# Patient Record
Sex: FEMALE | Race: BLACK OR AFRICAN AMERICAN | NOT HISPANIC OR LATINO | URBAN - METROPOLITAN AREA
[De-identification: names, ages, dates, MRNs, and addresses within clinical notes are randomized per-mention and may not be internally consistent; named-entity substitution may affect disease eponyms.]

---

## 2021-09-24 ENCOUNTER — EMERGENCY (EMERGENCY)
Facility: HOSPITAL | Age: 61
LOS: 1 days | Discharge: HOME | End: 2021-09-24
Attending: EMERGENCY MEDICINE | Admitting: EMERGENCY MEDICINE
Payer: MEDICARE

## 2021-09-24 VITALS
OXYGEN SATURATION: 100 % | TEMPERATURE: 99 F | HEIGHT: 59 IN | RESPIRATION RATE: 18 BRPM | SYSTOLIC BLOOD PRESSURE: 91 MMHG | DIASTOLIC BLOOD PRESSURE: 60 MMHG | WEIGHT: 100.09 LBS | HEART RATE: 76 BPM

## 2021-09-24 DIAGNOSIS — G25.82 STIFF-MAN SYNDROME: ICD-10-CM

## 2021-09-24 DIAGNOSIS — R53.1 WEAKNESS: ICD-10-CM

## 2021-09-24 DIAGNOSIS — R10.11 RIGHT UPPER QUADRANT PAIN: ICD-10-CM

## 2021-09-24 DIAGNOSIS — M54.5 LOW BACK PAIN: ICD-10-CM

## 2021-09-24 DIAGNOSIS — D25.9 LEIOMYOMA OF UTERUS, UNSPECIFIED: ICD-10-CM

## 2021-09-24 DIAGNOSIS — R63.4 ABNORMAL WEIGHT LOSS: ICD-10-CM

## 2021-09-24 LAB
ALBUMIN SERPL ELPH-MCNC: 4.2 G/DL — SIGNIFICANT CHANGE UP (ref 3.3–5)
ALP SERPL-CCNC: 75 U/L — SIGNIFICANT CHANGE UP (ref 40–120)
ALT FLD-CCNC: 10 U/L — SIGNIFICANT CHANGE UP (ref 10–45)
ANION GAP SERPL CALC-SCNC: 7 MMOL/L — SIGNIFICANT CHANGE UP (ref 5–17)
APTT BLD: 34.8 SEC — SIGNIFICANT CHANGE UP (ref 27.5–35.5)
AST SERPL-CCNC: 21 U/L — SIGNIFICANT CHANGE UP (ref 10–40)
BASOPHILS # BLD AUTO: 0.02 K/UL — SIGNIFICANT CHANGE UP (ref 0–0.2)
BASOPHILS NFR BLD AUTO: 0.4 % — SIGNIFICANT CHANGE UP (ref 0–2)
BILIRUB SERPL-MCNC: 0.3 MG/DL — SIGNIFICANT CHANGE UP (ref 0.2–1.2)
BUN SERPL-MCNC: 6 MG/DL — LOW (ref 7–23)
CALCIUM SERPL-MCNC: 9.2 MG/DL — SIGNIFICANT CHANGE UP (ref 8.4–10.5)
CHLORIDE SERPL-SCNC: 106 MMOL/L — SIGNIFICANT CHANGE UP (ref 96–108)
CO2 SERPL-SCNC: 30 MMOL/L — SIGNIFICANT CHANGE UP (ref 22–31)
CREAT SERPL-MCNC: 0.79 MG/DL — SIGNIFICANT CHANGE UP (ref 0.5–1.3)
EOSINOPHIL # BLD AUTO: 0.03 K/UL — SIGNIFICANT CHANGE UP (ref 0–0.5)
EOSINOPHIL NFR BLD AUTO: 0.6 % — SIGNIFICANT CHANGE UP (ref 0–6)
GLUCOSE SERPL-MCNC: 82 MG/DL — SIGNIFICANT CHANGE UP (ref 70–99)
HCT VFR BLD CALC: 41.9 % — SIGNIFICANT CHANGE UP (ref 34.5–45)
HGB BLD-MCNC: 13 G/DL — SIGNIFICANT CHANGE UP (ref 11.5–15.5)
IMM GRANULOCYTES NFR BLD AUTO: 0.4 % — SIGNIFICANT CHANGE UP (ref 0–1.5)
INR BLD: 1.05 — SIGNIFICANT CHANGE UP (ref 0.88–1.16)
LIDOCAIN IGE QN: 59 U/L — SIGNIFICANT CHANGE UP (ref 7–60)
LYMPHOCYTES # BLD AUTO: 1.45 K/UL — SIGNIFICANT CHANGE UP (ref 1–3.3)
LYMPHOCYTES # BLD AUTO: 26.9 % — SIGNIFICANT CHANGE UP (ref 13–44)
MCHC RBC-ENTMCNC: 29.8 PG — SIGNIFICANT CHANGE UP (ref 27–34)
MCHC RBC-ENTMCNC: 31 GM/DL — LOW (ref 32–36)
MCV RBC AUTO: 96.1 FL — SIGNIFICANT CHANGE UP (ref 80–100)
MONOCYTES # BLD AUTO: 0.47 K/UL — SIGNIFICANT CHANGE UP (ref 0–0.9)
MONOCYTES NFR BLD AUTO: 8.7 % — SIGNIFICANT CHANGE UP (ref 2–14)
NEUTROPHILS # BLD AUTO: 3.4 K/UL — SIGNIFICANT CHANGE UP (ref 1.8–7.4)
NEUTROPHILS NFR BLD AUTO: 63 % — SIGNIFICANT CHANGE UP (ref 43–77)
NRBC # BLD: 0 /100 WBCS — SIGNIFICANT CHANGE UP (ref 0–0)
PLATELET # BLD AUTO: 395 K/UL — SIGNIFICANT CHANGE UP (ref 150–400)
POTASSIUM SERPL-MCNC: 4.1 MMOL/L — SIGNIFICANT CHANGE UP (ref 3.5–5.3)
POTASSIUM SERPL-SCNC: 4.1 MMOL/L — SIGNIFICANT CHANGE UP (ref 3.5–5.3)
PROT SERPL-MCNC: 7.9 G/DL — SIGNIFICANT CHANGE UP (ref 6–8.3)
PROTHROM AB SERPL-ACNC: 12.6 SEC — SIGNIFICANT CHANGE UP (ref 10.6–13.6)
RBC # BLD: 4.36 M/UL — SIGNIFICANT CHANGE UP (ref 3.8–5.2)
RBC # FLD: 13.7 % — SIGNIFICANT CHANGE UP (ref 10.3–14.5)
SODIUM SERPL-SCNC: 143 MMOL/L — SIGNIFICANT CHANGE UP (ref 135–145)
WBC # BLD: 5.39 K/UL — SIGNIFICANT CHANGE UP (ref 3.8–10.5)
WBC # FLD AUTO: 5.39 K/UL — SIGNIFICANT CHANGE UP (ref 3.8–10.5)

## 2021-09-24 PROCEDURE — 80053 COMPREHEN METABOLIC PANEL: CPT

## 2021-09-24 PROCEDURE — 83690 ASSAY OF LIPASE: CPT

## 2021-09-24 PROCEDURE — 99283 EMERGENCY DEPT VISIT LOW MDM: CPT

## 2021-09-24 PROCEDURE — 85025 COMPLETE CBC W/AUTO DIFF WBC: CPT

## 2021-09-24 PROCEDURE — 85610 PROTHROMBIN TIME: CPT

## 2021-09-24 PROCEDURE — 85730 THROMBOPLASTIN TIME PARTIAL: CPT

## 2021-09-24 PROCEDURE — 99284 EMERGENCY DEPT VISIT MOD MDM: CPT

## 2021-09-24 PROCEDURE — 36415 COLL VENOUS BLD VENIPUNCTURE: CPT

## 2021-09-24 RX ORDER — ACETAMINOPHEN 500 MG
650 TABLET ORAL ONCE
Refills: 0 | Status: COMPLETED | OUTPATIENT
Start: 2021-09-24 | End: 2021-09-24

## 2021-09-24 RX ADMIN — Medication 650 MILLIGRAM(S): at 17:52

## 2021-09-24 NOTE — ED PROVIDER NOTE - PHYSICAL EXAMINATION
GEN: Elderly. frail, well developed, awake, alert, oriented to person, place, time/situation and in no apparent distress. NTAF  ENT: Airway patent, Nasal mucosa clear. Mouth with normal mucosa.  EYES: Clear bilaterally. PERRL, EOMI  RESPIRATORY: Breathing comfortably with normal RR. No W/C/R, no hypoxia or resp distress.  CARDIAC: Regular rate and rhythm, no M/R/G  ABDOMEN: Soft, nontender, +bowel sounds, no rebound, rigidity, or guarding. No CVAT. Nonsurgical abd.   MSK: Range of motion is not limited, no deformities noted.  NEURO: Alert and oriented, no focal deficits.  SKIN: Skin normal color for race, warm, dry and intact. No evidence of rash.  PSYCH: Alert and oriented to person, place, time/situation. normal mood and affect. no apparent risk to self or others.

## 2021-09-24 NOTE — ED PROVIDER NOTE - NSFOLLOWUPINSTRUCTIONS_ED_ALL_ED_FT
Please follow up with your primary care physician and a gastroenterologist for further workup of your symptoms and weight loss. If you have any problem getting an appointment this week, please call the ED Referral Coordinator at [phone].  Return to the Emergency Department if you have any new or worsening symptoms, or for any other concerns. Please read below for further information.      Abdominal Pain    Many things can cause abdominal pain. Many times, abdominal pain is not caused by a disease and will improve without treatment. Your health care provider will do a physical exam to determine if there is a dangerous cause of your pain; blood tests and imaging may help determine the cause of your pain. However, in many cases, no cause may be found and you may need further testing as an outpatient. Monitor your abdominal pain for any changes.     SEEK IMMEDIATE MEDICAL CARE IF YOU HAVE ANY OF THE FOLLOWING SYMPTOMS: worsening abdominal pain, uncontrollable vomiting, profuse diarrhea, inability to have bowel movements or pass gas, black or bloody stools, fever accompanying chest pain or back pain, or fainting. These symptoms may represent a serious problem that is an emergency. Do not wait to see if the symptoms will go away. Get medical help right away. Call 911 and do not drive yourself to the hospital.      High-Protein and High-Calorie Diet    Eating high-protein and high-calorie foods can help you to gain weight, heal after an injury, and recover after an illness or surgery. The specific amount of daily protein and calories you need depends on:  •Your body weight.      •The reason this diet is recommended for you.        What is my plan?  Generally, a high-protein, high-calorie diet involves:  •Eating 250–500 extra calories each day.      •Making sure that you get enough of your daily calories from protein. Ask your health care provider how many of your calories should come from protein.      Talk with a health care provider, such as a diet and nutrition specialist (dietitian), about how much protein and how many calories you need each day. Follow the diet as directed by your health care provider.      What are tips for following this plan?     Preparing meals     •Add whole milk, half-and-half, or heavy cream to cereal, pudding, soup, or hot cocoa.      •Add whole milk to instant breakfast drinks.      •Add peanut butter to oatmeal or smoothies.      •Add powdered milk to baked goods, smoothies, or milkshakes.      •Add powdered milk, cream, or butter to mashed potatoes.      •Add cheese to cooked vegetables.      •Make whole-milk yogurt parfaits. Top them with granola, fruit, or nuts.      •Add cottage cheese to your fruit.      •Add avocado, cheese, or both to sandwiches or salads.      •Add meat, poultry, or seafood to rice, pasta, casseroles, salads, and soups.      •Use mayonnaise when making egg salad, chicken salad, or tuna salad.      •Use peanut butter as a dip for vegetables or as a topping for pretzels, celery, or crackers.      •Add beans to casseroles, dips, and spreads.      •Add pureed beans to sauces and soups.      •Replace calorie-free drinks with calorie-containing drinks, such as milk and fruit juice.      •Replace water with milk or heavy cream when making foods such as oatmeal, pudding, or cocoa.      General instructions     •Ask your health care provider if you should take a nutritional supplement.      •Try to eat six small meals each day instead of three large meals.      •Eat a balanced diet. In each meal, include one food that is high in protein.      •Keep nutritious snacks available, such as nuts, trail mixes, dried fruit, and yogurt.      •If you have kidney disease or diabetes, talk with your health care provider about how much protein is safe for you. Too much protein may put extra stress on your kidneys.      •Drink your calories. Choose high-calorie drinks and have them after your meals.        What high-protein foods should I eat?     Vegetables     Soybeans. Peas.    Grains     Quinoa. Bulgur wheat.    Meats and other proteins     Beef, pork, and poultry. Fish and seafood. Eggs. Tofu. Textured vegetable protein (TVP). Peanut butter. Nuts and seeds. Dried beans. Protein powders.    Dairy     Whole milk. Whole-milk yogurt. Powdered milk. Cheese. Cottage Cheese. Eggnog.    Beverages     High-protein supplement drinks. Soy milk.    Other foods     Protein bars.    The items listed above may not be a complete list of high-protein foods and beverages. Contact a dietitian for more options.       What high-calorie foods should I eat?    Fruits     Dried fruit. Fruit leather. Canned fruit in syrup. Fruit juice. Avocado.    Vegetables     Vegetables cooked in oil or butter. Fried potatoes.    Grains     Pasta. Quick breads. Muffins. Pancakes. Ready-to-eat cereal.    Meats and other proteins     Peanut butter. Nuts and seeds.    Dairy     Heavy cream. Whipped cream. Cream cheese. Sour cream. Ice cream. Custard. Pudding.    Beverages     Meal-replacement beverages. Nutrition shakes. Fruit juice. Sugar-sweetened soft drinks.    Seasonings and condiments     Salad dressing. Mayonnaise. Edmond sauce. Fruit preserves or jelly. Honey. Syrup.    Sweets and desserts     Cake. Cookies. Pie. Pastries. Candy bars. Chocolate.    Fats and oils     Butter or margarine. Oil. Gravy.    Other foods     Meal-replacement bars.    The items listed above may not be a complete list of high-calorie foods and beverages. Contact a dietitian for more options.       Summary    •A high-protein, high-calorie diet can help you gain weight or heal faster after an injury, illness, or surgery.      •To increase your protein and calories, add ingredients such as whole milk, peanut butter, cheese, beans, meat, or seafood to meal items.      •To get enough extra calories each day, include high-calorie foods and beverages at each meal.      •Adding a high-calorie drink or shake can be an easy way to help you get enough calories each day. Talk with your healthcare provider or dietitian about the best options for you.      This information is not intended to replace advice given to you by your health care provider. Make sure you discuss any questions you have with your health care provider.

## 2021-09-24 NOTE — ED PROVIDER NOTE - OBJECTIVE STATEMENT
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 Pt is followed at [facility] and was worked up for this back about 2 weeks ago with CT a/p (pt brings reports with her) that shows uterine fibroids and remonstration of CBD dilation to 9mm and prominence of main pancreatic duct (stable from 6/27/20) likely benign but recommend MRCP if clinically warranted. Pt states she had an appointment with GI at [facility] but was not happy that it was a televisit. She felt generally weak this morning so decided to come to .

## 2021-09-24 NOTE — ED PROVIDER NOTE - PATIENT PORTAL LINK FT
You can access the FollowMyHealth Patient Portal offered by [facility] by registering at the following website: [email or web address]. By joining [facility]’s FollowMyHealth portal, you will also be able to view your health information using other applications (apps) compatible with our system.

## 2021-09-24 NOTE — ED PROVIDER NOTE - IV ALTEPLASE INCLUSION HIDDEN
PROGRESS NOTE:   Authored by Deandre Dwyer [phone]    Patient is a 37y old  Male who presents with a chief complaint of abdominal pain (13 Aug 2020 10:28)    SUBJECTIVE / OVERNIGHT EVENTS:  Patient w/ significant bleeding at permacat site overnight. There was no evidence of hematoma and patient was hemodynamically stable. Pressure was applied to the site, which stopped the bleeding.     REVIEW OF SYSTEMS:  CONSTITUTIONAL: No weakness, fevers or chills  EYES/ENT: No visual changes;  No vertigo or throat pain   NECK: No pain or stiffness  RESPIRATORY: No cough, wheezing, hemoptysis; No shortness of breath  CARDIOVASCULAR: No chest pain or palpitations  GASTROINTESTINAL: No abdominal or epigastric pain. No nausea, vomiting, or hematemesis; No diarrhea or constipation. No melena or hematochezia.  GENITOURINARY: No dysuria, frequency or hematuria  NEUROLOGICAL: No numbness or weakness  SKIN: No itching, rashes    MEDICATIONS  (STANDING):  chlorhexidine 4% Liquid 1 Application(s) Topical <User Schedule>  hepatitis A (virus) Vaccine - Adult (HAVRIX) 1 milliLiter(s) IntraMuscular once  melatonin 1 milliGRAM(s) Oral at bedtime  nystatin Powder 1 Application(s) Topical two times a day  pantoprazole    Tablet 40 milliGRAM(s) Oral before breakfast  pneumococcal  13 Vaccine (PREVNAR 13) 0.5 milliLiter(s) IntraMuscular once  polyethylene glycol 3350 17 Gram(s) Oral two times a day  rifAXIMin 550 milliGRAM(s) Oral two times a day  sevelamer carbonate 800 milliGRAM(s) Oral three times a day with meals  thiamine 100 milliGRAM(s) Oral daily    MEDICATIONS  (PRN):  ondansetron   Disintegrating Tablet 4 milliGRAM(s) Oral daily PRN Nausea  sodium chloride 0.9% lock flush 10 milliLiter(s) IV Push every 1 hour PRN Pre/post blood products, medications, blood draw, and to maintain line patency    PHYSICAL EXAM:  Vital Signs Last 24 Hrs  T(C): 36.8 (14 Aug 2020 03:20), Max: 37.4 (13 Aug 2020 21:24)  T(F): 98.3 (14 Aug 2020 03:20), Max: 99.3 (13 Aug 2020 21:24)  HR: 98 (14 Aug 2020 03:20) (98 - 104)  BP: 118/76 (14 Aug 2020 03:20) (110/72 - 118/76)  BP(mean): --  RR: 18 (14 Aug 2020 03:20) (18 - 18)  SpO2: 94% (14 Aug 2020 03:20) (90% - 94%)    GENERAL: No acute distress, well-developed  HEAD:  Atraumatic, Normocephalic  EYES: conjunctiva and sclera clear  NECK: Supple, no JVD  CHEST/LUNG: CTAB, no wheezes, rales, or rhonchi  HEART: Regular rate and rhythm, no murmurs, rubs, or gallops  ABDOMEN: Soft, non-tender, non-distended, normal bowel sounds  EXTREMITIES:  2+ peripheral pulses b/l, no clubbing, cyanosis, or edema  NEUROLOGY: A&O x 3, no focal deficits  SKIN: No rashes or lesions    LABS:             7.8    19.75 )-----------( 153      ( 14 Aug 2020 07:16 )             22.6     RADIOLOGY & ADDITIONAL TESTS:  Results Reviewed [Y/N]: Y  Imaging Personally Reviewed [Y/N]: Y  Electrocardiogram Personally Reviewed [Y/N]: Y    COORDINATION OF CARE:  Care Discussed with Consultants/Other Providers [Y/N]: MJ PROGRESS NOTE:   Authored by Deandre Dwyer [phone]    Patient is a 37y old  Male who presents with a chief complaint of abdominal pain (13 Aug 2020 10:28)    SUBJECTIVE / OVERNIGHT EVENTS:  Patient w/ significant bleeding at permacat site overnight. There was no evidence of hematoma and patient was hemodynamically stable. Pressure was applied to the site, which stopped the bleeding. IR evaluated the site this am and changed the dressing. There was no active bleeding and no need for additional sutures at that time. The patient also noted bright red blood with BM overnight. The patient reported nausea, increased abdominal bloating, and abdominal pain today. He denies vomiting, diarrhea, SOB, CP,     REVIEW OF SYSTEMS:  CONSTITUTIONAL: No weakness, fevers or chills  EYES/ENT: No visual changes;  No vertigo or throat pain   NECK: No pain or stiffness  RESPIRATORY: No cough, wheezing, hemoptysis; No shortness of breath  CARDIOVASCULAR: No chest pain or palpitations  GASTROINTESTINAL: hematochezia, distension, abdominal pain, nausea No vomiting or hematemesis; No diarrhea or constipation. No melena  GENITOURINARY: No dysuria, frequency or hematuria  NEUROLOGICAL: No numbness or weakness  SKIN: No itching, rashes    MEDICATIONS  (STANDING):  chlorhexidine 4% Liquid 1 Application(s) Topical <User Schedule>  hepatitis A (virus) Vaccine - Adult (HAVRIX) 1 milliLiter(s) IntraMuscular once  melatonin 1 milliGRAM(s) Oral at bedtime  nystatin Powder 1 Application(s) Topical two times a day  pantoprazole    Tablet 40 milliGRAM(s) Oral before breakfast  pneumococcal  13 Vaccine (PREVNAR 13) 0.5 milliLiter(s) IntraMuscular once  polyethylene glycol 3350 17 Gram(s) Oral two times a day  rifAXIMin 550 milliGRAM(s) Oral two times a day  sevelamer carbonate 800 milliGRAM(s) Oral three times a day with meals  thiamine 100 milliGRAM(s) Oral daily    MEDICATIONS  (PRN):  ondansetron   Disintegrating Tablet 4 milliGRAM(s) Oral daily PRN Nausea  sodium chloride 0.9% lock flush 10 milliLiter(s) IV Push every 1 hour PRN Pre/post blood products, medications, blood draw, and to maintain line patency    PHYSICAL EXAM:  Vital Signs Last 24 Hrs  T(C): 36.8 (14 Aug 2020 03:20), Max: 37.4 (13 Aug 2020 21:24)  T(F): 98.3 (14 Aug 2020 03:20), Max: 99.3 (13 Aug 2020 21:24)  HR: 98 (14 Aug 2020 03:20) (98 - 104)  BP: 118/76 (14 Aug 2020 03:20) (110/72 - 118/76)  RR: 18 (14 Aug 2020 03:20) (18 - 18)  SpO2: 94% (14 Aug 2020 03:20) (90% - 94%)    GENERAL: No acute distress, well-developed  HEAD:  Atraumatic, Normocephalic  EYES: conjunctiva and sclera clear  NECK: Supple, no JVD  CHEST/LUNG: permacath dressing bloody CTAB, no wheezes, rales, or rhonchi  HEART: Regular rate and rhythm, no murmurs, rubs, or gallops  ABDOMEN: R sided tenderness, distended Soft, normal bowel sounds  RECTAL: External hemorrhoid no blood or melena	  EXTREMITIES:  2+ peripheral pulses b/l, no clubbing, cyanosis, or edema  NEUROLOGY: A&O x 3, no focal deficits  SKIN: jaundice No rashes or lesions    LABS:             7.8    19.75 )-----------( 153      ( 14 Aug 2020 07:16 )             22.6     08-14  131<L>  |  93<L>  |  37<H>  ----------------------------<  94  3.9   |  24  |  7.11<H>  Ca    8.8      14 Aug 2020 07:15  Phos  5.2     08-14  Mg     2.0     08-14  TPro  5.8<L>  /  Alb  3.1<L>  /  TBili  19.3<H>  /  DBili  x   /  AST  151<H>  /  ALT  23  /  AlkPhos  140<H>  08-14  	  RADIOLOGY & ADDITIONAL TESTS:  Results Reviewed [Y/N]: Y  Imaging Personally Reviewed [Y/N]: Y  Electrocardiogram Personally Reviewed [Y/N]: Y    COORDINATION OF CARE:  Care Discussed with Consultants/Other Providers [Y/N]: MJ show

## 2021-10-26 PROBLEM — Z00.00 ENCOUNTER FOR PREVENTIVE HEALTH EXAMINATION: Status: ACTIVE | Noted: 2021-10-26

## 2021-10-26 PROBLEM — G25.82 STIFF-MAN SYNDROME: Chronic | Status: ACTIVE | Noted: 2021-09-24

## 2022-01-21 ENCOUNTER — APPOINTMENT (OUTPATIENT)
Dept: NEUROLOGY | Facility: CLINIC | Age: 62
End: 2022-01-21
Payer: MEDICARE

## 2022-01-21 PROCEDURE — 99205 OFFICE O/P NEW HI 60 MIN: CPT | Mod: 95

## 2022-01-21 RX ORDER — CLONAZEPAM 1 MG/1
1 TABLET ORAL
Qty: 90 | Refills: 1 | Status: ACTIVE | COMMUNITY
Start: 2022-01-21 | End: 1900-01-01

## 2022-01-21 RX ORDER — BACLOFEN 10 MG/1
10 TABLET ORAL 3 TIMES DAILY
Qty: 90 | Refills: 1 | Status: ACTIVE | COMMUNITY
Start: 2022-01-21 | End: 1900-01-01

## 2022-01-21 NOTE — HISTORY OF PRESENT ILLNESS
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  She would have falls too easily.  Loud noises would make her have body jerking.\par The diagnosis was made from the blood-work and clinical exam, and does not recall every having CSF/LP.\par \par Reports minimal brain symptoms - no seizures, no cognitive dysfunction.\par Also known to have a meningioma.  Operated June 2020.\par Cataract surgery Dec 5, 2021, caused her vision problems which she is hoping will improve.\par \par Went through IVIg, but just a few infusions, perhaps about 6 months.  \par One admission for IVIg, she felt she became more stiff after the IVIg most recently at [facility] just 3 weeks, and derived no benefit.\par \par clonazepam which she still continues at 1mg bid.\par Continues on baclofen 5mg bid.\par She had tried gabapentin once, but did not try it for very long.\par Had not been on pregabalin/lyrica.\par \par She feels she is not sleeping at night and not sleepy during the day.\par \par Sleep Routine:\par Retires: 8-9pm\par Latency: hours\par Arousals: \par Awakens:  4am\par \par Sleep aids attempted/failed:  none she can recall.\par \par In 2021 in the summer, she was given rituximab at [facility].  She felt she could not tolerate it - felt she was shivering and felt every part of her body 'was going crazy', for months.  She did not perceive any benefit.\par \par She was then offered stem cells about 6 months after the rituximab, and borrowed money for the treatment.  She felt this did nothing for her.  There were also injections performed.\par \par There is extensive back pain, her trunk, feels so tight it brings her to tears.  She has not pushed doses higher than currently.\par

## 2022-01-21 NOTE — PHYSICAL EXAM
[FreeTextEntry1] : General:\par Constitutional:  Sitting comfortably in NAD.\par Psychiatric: well-groomed, appropriate affect, insight/judgment intact\par \par Cognitive:\par Orientation, language, memory and knowledge screens intact.\par No expressive or receptive errors.\par \par Cranial Nerves:\par VII: Face appears symmetric \par Voice slightly strained/tight.\par \par

## 2022-01-21 NOTE — HISTORY OF PRESENT ILLNESS
[FreeTextEntry1] : \par Lizbeth is a 62yo woman diagnosed with stiff person syndrome at [place] in 2015.\par Symptoms went back to 2013.  She would have falls too easily.  Loud noises would make her have body jerking.\par The diagnosis was made from the blood-work and clinical exam, and does not recall every having CSF/LP.\par \par Reports minimal brain symptoms - no seizures, no cognitive dysfunction.\par Also known to have a meningioma.  Operated June 2020.\par Cataract surgery Dec 5, 2021, caused her vision problems which she is hoping will improve.\par \par Went through IVIg, but just a few infusions, perhaps about 6 months.  \par One admission for IVIg, she felt she became more stiff after the IVIg most recently at [facility] just 3 weeks, and derived no benefit.\par \par clonazepam which she still continues at 1mg bid.\par Continues on baclofen 5mg bid.\par She had tried gabapentin once, but did not try it for very long.\par Had not been on pregabalin/lyrica.\par \par She feels she is not sleeping at night and not sleepy during the day.\par \par Sleep Routine:\par Retires: 8-9pm\par Latency: hours\par Arousals: \par Awakens:  4am\par \par Sleep aids attempted/failed:  none she can recall.\par \par In 2021 in the summer, she was given rituximab at [facility].  She felt she could not tolerate it - felt she was shivering and felt every part of her body 'was going crazy', for months.  She did not perceive any benefit.\par \par She was then offered stem cells about 6 months after the rituximab, and borrowed money for the treatment.  She felt this did nothing for her.  There were also injections performed.\par \par There is extensive back pain, her trunk, feels so tight it brings her to tears.  She has not pushed doses higher than currently.\par

## 2022-01-21 NOTE — DISCUSSION/SUMMARY
[FreeTextEntry1] : Impression:\par 1) pt presenting with diagnosis of stiff-person syndrome, but today was  moved to a televisit, so will have to do the neuro-examination at her next visit.\par 2) meningioma resection?\par \par Plan:\par 1) labs today\par 2) likely to need CSF to gauge severity\par 3) will push up medications, then consider pregabalin, then consider alternative treatments such as botox or baclofen pump etc.

## 2023-02-17 ENCOUNTER — LABORATORY RESULT (OUTPATIENT)
Age: 63
End: 2023-02-17

## 2023-02-17 ENCOUNTER — APPOINTMENT (OUTPATIENT)
Dept: NEUROLOGY | Facility: CLINIC | Age: 63
End: 2023-02-17
Payer: MEDICARE

## 2023-02-17 VITALS
HEART RATE: 75 BPM | BODY MASS INDEX: 18.95 KG/M2 | OXYGEN SATURATION: 99 % | HEIGHT: 59 IN | DIASTOLIC BLOOD PRESSURE: 57 MMHG | TEMPERATURE: 96.9 F | WEIGHT: 94 LBS | SYSTOLIC BLOOD PRESSURE: 88 MMHG

## 2023-02-17 PROCEDURE — 99214 OFFICE O/P EST MOD 30 MIN: CPT

## 2023-02-17 NOTE — REVIEW OF SYSTEMS
[Feeling Tired] : feeling tired [As Noted in HPI] : as noted in HPI [Muscle Weakness] : muscle weakness

## 2023-02-21 LAB
A-TUMOR NECROSIS FACT SERPL-MCNC: <1.7 PG/ML
ALBUMIN SERPL ELPH-MCNC: 4.1 G/DL
ALP BLD-CCNC: 73 U/L
ALT SERPL-CCNC: 9 U/L
ANA SER IF-ACNC: NEGATIVE
ANION GAP SERPL CALC-SCNC: 13 MMOL/L
AST SERPL-CCNC: 20 U/L
BASOPHILS # BLD AUTO: 0.03 K/UL
BASOPHILS NFR BLD AUTO: 0.7 %
BILIRUB SERPL-MCNC: 0.3 MG/DL
BUN SERPL-MCNC: 7 MG/DL
C3 SERPL-MCNC: 158 MG/DL
C4 SERPL-MCNC: 46 MG/DL
CALCIUM SERPL-MCNC: 9.5 MG/DL
CD16+CD56+ CELLS # BLD: 193 CELLS/UL
CD16+CD56+ CELLS NFR BLD: 16 %
CD19 CELLS NFR BLD: 70 CELLS/UL
CD3 CELLS # BLD: 923 CELLS/UL
CD3 CELLS NFR BLD: 76 %
CD3+CD4+ CELLS # BLD: 580 CELLS/UL
CD3+CD4+ CELLS NFR BLD: 47 %
CD3+CD4+ CELLS/CD3+CD8+ CLL SPEC: 1.75 RATIO
CD3+CD8+ CELLS # SPEC: 331 CELLS/UL
CD3+CD8+ CELLS NFR BLD: 27 %
CELLS.CD3-CD19+/CELLS IN BLOOD: 6 %
CHLORIDE SERPL-SCNC: 104 MMOL/L
CO2 SERPL-SCNC: 22 MMOL/L
CREAT SERPL-MCNC: 0.64 MG/DL
CRP SERPL-MCNC: 3 MG/L
EGFR: 100 ML/MIN/1.73M2
EOSINOPHIL # BLD AUTO: 0.09 K/UL
EOSINOPHIL NFR BLD AUTO: 2.1 %
ERYTHROCYTE [SEDIMENTATION RATE] IN BLOOD BY WESTERGREN METHOD: 14 MM/HR
ESTIMATED AVERAGE GLUCOSE: 157 MG/DL
GLUCOSE SERPL-MCNC: 146 MG/DL
HBA1C MFR BLD HPLC: 7.1 %
HCT VFR BLD CALC: 38.9 %
HGB BLD-MCNC: 12.5 G/DL
IGNF SERPL-MCNC: <4.2 PG/ML
IL10 SERPL-MCNC: <2.8 PG/ML
IL12 SERPL-MCNC: <1.9 PG/ML
IL13 SERPL-MCNC: <1.7 PG/ML
IL17A SERPL-MCNC: <1.4 PG/ML
IL2 SERPL-MCNC: 458.1 PG/ML
IL2 SERPL-MCNC: <2.1 PG/ML
IL4 SERPL-MCNC: <2.2 PG/ML
IL6 SERPL-MCNC: <2 PG/ML
IL8 SERPL-MCNC: <3 PG/ML
IMM GRANULOCYTES NFR BLD AUTO: 0.2 %
INTERLEUKIN 1 BETA: <6.5 PG/ML
INTERLEUKIN 5: <2.1 PG/ML
LYMPHOCYTES # BLD AUTO: 1.44 K/UL
LYMPHOCYTES NFR BLD AUTO: 33.3 %
MAN DIFF?: NORMAL
MCHC RBC-ENTMCNC: 30.5 PG
MCHC RBC-ENTMCNC: 32.1 GM/DL
MCV RBC AUTO: 94.9 FL
MONOCYTES # BLD AUTO: 0.38 K/UL
MONOCYTES NFR BLD AUTO: 8.8 %
NEUTROPHILS # BLD AUTO: 2.38 K/UL
NEUTROPHILS NFR BLD AUTO: 54.9 %
PLATELET # BLD AUTO: 237 K/UL
POTASSIUM SERPL-SCNC: 4.5 MMOL/L
PROT SERPL-MCNC: 6.6 G/DL
RBC # BLD: 4.1 M/UL
RBC # FLD: 14 %
RHEUMATOID FACT SER QL: <10 IU/ML
SODIUM SERPL-SCNC: 139 MMOL/L
THYROGLOB AB SERPL-ACNC: <20 IU/ML
THYROPEROXIDASE AB SERPL IA-ACNC: <10 IU/ML
TSH SERPL-ACNC: 0.44 UIU/ML
WBC # FLD AUTO: 4.33 K/UL

## 2023-02-23 LAB
ALBUMIN MFR SERPL ELPH: 58.6 %
ALBUMIN SERPL-MCNC: 3.9 G/DL
ALBUMIN/GLOB SERPL: 1.4 RATIO
ALPHA1 GLOB MFR SERPL ELPH: 4 %
ALPHA1 GLOB SERPL ELPH-MCNC: 0.3 G/DL
ALPHA2 GLOB MFR SERPL ELPH: 11.2 %
ALPHA2 GLOB SERPL ELPH-MCNC: 0.7 G/DL
B-GLOBULIN MFR SERPL ELPH: 11.6 %
B-GLOBULIN SERPL ELPH-MCNC: 0.8 G/DL
CLONAZEPAM SERPL-MCNC: 14 NG/ML
GAMMA GLOB FLD ELPH-MCNC: 1 G/DL
GAMMA GLOB MFR SERPL ELPH: 14.6 %
INTERPRETATION SERPL IEP-IMP: NORMAL
PROT SERPL-MCNC: 6.6 G/DL
PROT SERPL-MCNC: 6.6 G/DL

## 2023-03-03 ENCOUNTER — APPOINTMENT (OUTPATIENT)
Dept: NEUROLOGY | Facility: CLINIC | Age: 63
End: 2023-03-03
Payer: MEDICARE

## 2023-03-03 VITALS
WEIGHT: 90 LBS | HEIGHT: 59 IN | SYSTOLIC BLOOD PRESSURE: 107 MMHG | OXYGEN SATURATION: 99 % | HEART RATE: 79 BPM | DIASTOLIC BLOOD PRESSURE: 65 MMHG | BODY MASS INDEX: 18.14 KG/M2 | TEMPERATURE: 97.3 F

## 2023-03-03 PROCEDURE — 20553 NJX 1/MLT TRIGGER POINTS 3/>: CPT

## 2023-03-03 PROCEDURE — 99215 OFFICE O/P EST HI 40 MIN: CPT | Mod: 25

## 2023-03-03 NOTE — PHYSICAL EXAM
[FreeTextEntry1] : General:\par In a strapped back brace with neck support.\par Constitutional:  Sitting comfortably in NAD.\par Psychiatric: well-groomed, appropriate affect, pleasant and calm.\par Ears, Nose, Throat: no abnormalities, mucus membranes moist\par Neck: trigger point in both levator scapula and less-so in the trapezius upper x 2; some in the rhomboids.\par Extremities: no edema, clubbing or cyanosis\par Skin: no rash or neuro-cutaneous signs \par \par Cognitive:\par Orientation, language, memory and knowledge screens intact.\par \par Cranial Nerves:\par II: MILADYS. III/IV/VI: EOM Full.  VII: Face appears symmetric VIII: Normal to screening\par IX/X: normal phonation  XI: Trapezius Symmetric  XII: Tongue midline\par \par Motor:\par Power: no pronator drift\par \par Narrow based gait\par \par \par \par

## 2023-03-03 NOTE — HISTORY OF PRESENT ILLNESS
[FreeTextEntry1] : \par Lizbeth is a 62yo woman.\par \par The 2 most important labs from last visit NSQ or otherwise auto-cancelled so she returned today partly to have these re-drawn.\par \par Reports she tried the pregabalin 50mg for several days, just 50mg at night.  But felt pain may have been worse in the morning and had nausea. However, she may have stopped the clonazepam in exhange, which would explain why pain was worse.  Reports clonazepam had worked the best, but then stopped helping fully, but if she stops things worsen.\par \par Prior to this visit I spoke to Dr. Amy Funk, who first saw her in 2014.\par Presented oddly and initially appeared non-organic, but then some other localized stiff-person videos were viewed at a conference and JOHANNE antibodies were drawn and returned in the 1,000's.\par \par Treated with steroids, PLEX, IVIg short courses only, without benefit. \par Seen by SPS expert at [place] who recommended rituximab.  Seen at [facility] where it was performed twice.  However she continued to report significant pain.  She was also intermittently difficult to deal with from a personality perspective, flaring up with reported pain.\par \par Found to have Portal vein stenosis - looked for a primary neoplasm.  PET negative.\par Sphenoid wing meningioma - gamma knife just recently completed.\par \par Pushed up cymbalta, no impact. So came back down.  Benzos helpful.\par \par Ángela reports that Pt in her house crying all the time.\par \par Clinical trial in [place] - Bone marrow transplant; needed new EMG - but EMG at [facility] was not convincing for SPS.  As an aside, she also notes that during the EMG study she was on a lot of clonazepam.\par  \par \par AbsCD19 70\par \par In 2021 in the summer, she was given rituximab at [facility].  She felt she could not tolerate it - felt she was shivering and felt every part of her body 'was going crazy', for months.  She did not perceive any benefit.\par \par She was then offered stem cells about 6 months after the rituximab, and borrowed money for the treatment.  She felt this did nothing for her.  There were also injections performed, which may have been epidural injections.\par \par Pain seems to be the worse for her.  Extensive back pain, first visits felt mostly in the trunk, feels so tight.\par But today, reports mostly cervical upper back tightness and pain.\par \par PRIOR:\par Diagnosed with stiff person syndrome at [facility] in 2015, symptoms went back to 2013.\par She would have falls too easily.  Loud noises would make her have body jerking.\par The diagnosis was made from the blood-work, and does not recall every having CSF/LP.\par \par Reports minimal brain symptoms - no seizures, no cognitive dysfunction.\par Also known to have a meningioma.  Operated June 2020.\par Cataract surgery Dec 5, 2021, caused her vision problems which she is hoping will improve.\par \par Went through IVIg, but just a few infusions, perhaps about 6 months.  \par One admission for IVIg, she felt she became more stiff after the IVIg most recently at [facility] just 3 weeks, and derived no benefit.\par \par clonazepam which she still continues at 1mg bid.\par Continues on baclofen 5mg bid.\par She had tried gabapentin once, but did not try it for very long.\par Had not been on pregabalin/lyrica.\par \par She feels she is not sleeping at night and not sleepy during the day.\par \par Sleep Routine:\par Retires: 8-9pm\par Latency: hours\par Arousals: \par Awakens:  4am\par \par \par \par \par

## 2023-03-03 NOTE — PROCEDURE
[FreeTextEntry1] : Trigger point injections provided, see separate note.\par THis was a trial of 4 injections; would add rhomboids and supraspinitis or cervical paraspinals in future.

## 2023-03-03 NOTE — DISCUSSION/SUMMARY
[FreeTextEntry1] : Impression:\par 1) anti-JOHANNE antibodes - two of the most important test drawn last visit did not make it to the lab/NSQ.  Spoke with Dr. Amy Funk who reported it was in the 1,000's.  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  Notes no leg stiffness and lower back is relatively spared.  Controversial between neurologists she has seen but many believe it is true, though possible some overlay.  Treated with steroids, PLEX, IVIg short course, rituximab x 2 at [facility], but patient's main complaint is with cervical back pain today.  May also need to consider chronic treatments to avoid progression in disease, as currently localized.  Trouble with tolerating treatments, but may respond better in hospital with slower infusions.\par 3) upper back pain - with trigger points, may respond to trigger point injections for relief.\par 4) Portal vein stenosis - PET negative for neoplasm.\par 5) Sphenoid wing meningioma - gamma knife just recently, seems unrelated.\par \par \par Plan:\par 1) trigger point injection trial today.  Only have kenalog here, will prefer dexamethasone next time.\par 2) retrial of low dose pregabalin/lyrica - but do not stop clonazepam, just adding it on.\par 3) repeat lab - JOHANNE and ENS2 as was not sent to lab/NSQ.\par 4) RTC in 1 month.

## 2023-03-03 NOTE — PROCEDURE
[FreeTextEntry3] : Intramuscular (non-articular, non-tendonous) Trigger point injections were explained to the patient, and potential complications discussed including pain from site locally, and rarely infection or bleeding from these local sites. \par The areas to be injected were cleaned with alcohol swabs and allowed to dry prior to injection.\par A sterile solution of 2 : 1.5 : 1.5 ratio normal saline: 50mg/5mL Kenalo.5%bupivacaine no epi was drawn.\par \par Lot #s: \par Lidocaine 2.5%/Prilocaine 2.5% topical NDC 7619-7375-11, [id number]\par 0.9% saline: NDC 5845-3844-50 lot [id number] exp \par Kenalog-10 (triamcinolone actetate) NDC 4896-3719-12 Lot [id number], Exp Mar 2023\par bupivacaine/sensorcaine 0.5% NDC 53701-091-89 [id number]   Lot [id number] exp 10/25\par \par A total of 4 cc was injected in the following regions (1cc each)\par 1) L upper trapezius\par 2) R upper trapezius\par 3) L levator scapula\par 4) R levator scapula\par \par The patient tolerated the procedure without issues.\par \par

## 2023-03-06 ENCOUNTER — APPOINTMENT (OUTPATIENT)
Dept: NEUROLOGY | Facility: CLINIC | Age: 63
End: 2023-03-06

## 2023-03-10 NOTE — ASSESSMENT
[FreeTextEntry1] : 1) Labs today \par 2) Start Pregabalin 50mg, start taking once at night time, then the second week take BID, and at the 3rd week take one in the morning and two at night\par 3) Will try to obtain outside records and see her back soon for further suggestions.\par

## 2023-03-10 NOTE — PHYSICAL EXAM
[FreeTextEntry1] : General:\par Constitutional: Sitting comfortably in NAD\par Psychiatric: well-groomed, irritated affect, insight/judgement intact\par Ears, Nose, Throat: no abnormalities, mucus membranes moist\par Extremities: no edema, clubbing, or cyanosis\par Skin: no rash or neurocutaneous signs\par \par Cognitive:\par Orientation, language, memory and knowledge screens intact\par Cranial Nerves:\par II: III/IV/VI: PERRL EOMI, no nystagmus\par V1V2V3: Symmetric. VII: Face appears symmetric. VIII: Normal to screening, IX/X: Palate elevates symmetrical. XI: Trapezius symmetric. XII: Tongue midline\par \par Motor: Power: 5/5 screening\par Tone: Normal x4 limbs\par But lower back musculature appears surprisingly rigid and straightened.\par \par Tremor: none\par \par Trunk tender to palpation.\par \par Minimal loss of vibratory sensation bilateral feet \par \par Sensation: intact to light touch\par \par Coordination/Gait:\par Finger-nose-finger intact, normal rapid-alternating movements\par FIne motor normal with normal rapid finger taps and heel tapping\par Hops well on both feet\par Difficulty walking heel to toe\par Romberg negative\par \par Reflexes:\par DTR: 2+ symmetric x4 limbs

## 2023-03-10 NOTE — HISTORY OF PRESENT ILLNESS
[FreeTextEntry1] : 2/17/23 HPI:\par \par Lizbeth is a 62 year old female presenting with stiff-person syndrome. Believes symptoms started in 2013. Notes significant pain around her trunk and ribcage. States pain, full body weakness, and fatigue is worse than muscle stiffness. Has 0 pain free days. Takes Baclofen and Clonazepam with little relief. Tried IVIG in the past but only for 5 months.\par \par Denies issues with swallowing or with memory.\par \par Has home health aid, with patient 6 hours 6 days per week.\par \par PMH: scoliosis, meningioma s/p resection - just started radiation on [place] \par \par Background:\landon Nieves is a 62yo woman diagnosed with stiff person syndrome at [facility] in 2015.\par Symptoms went back to 2013. She would have falls too easily. Loud noises would make her have body jerking.\par The diagnosis was made from the blood-work and clinical exam, and does not recall every having CSF/LP.\par \par Reports minimal brain symptoms - no seizures, no cognitive dysfunction.\par Also known to have a meningioma. Operated June 2020.\par Cataract surgery Dec 5, 2021, caused her vision problems which she is hoping will improve.\par \par Went through IVIg, but just a few infusions, perhaps about 5-6 months. \par One admission for IVIg, she felt she became more stiff after the IVIg most recently at [facility] just 3 weeks, and derived no benefit.\par \par clonazepam at 1mg bid.\par baclofen 5mg bid.\par She had tried gabapentin once, but did not try it for very long.\par Had not been on pregabalin/lyrica.\par \par She feels she is not sleeping at night and not sleepy during the day.\par \par Sleep Routine:\par Retires: 8-9pm\par Latency: hours\par Arousals: \par Awakens: 4am\par \par Sleep aids attempted/failed: none she can recall.\par \par In 2021 in the summer, she was given rituximab at [facility]. She felt she could not tolerate it - felt she was shivering and felt every part of her body 'was going crazy', for months. She did not perceive any benefit.\par \par She was then offered stem cells about 6 months after the rituximab, and borrowed money for the treatment. She felt this did nothing for her. There were also injections performed.\par \par There is extensive back pain, her trunk, feels so tight it brings her to tears. She has not pushed doses higher than currently.

## 2023-03-10 NOTE — DISCUSSION/SUMMARY
[FreeTextEntry1] : Patient is a 62 year old female presenting for follow up for stiff-person syndrome. \par \par 1) Stiff-person syndrome, associated with extremely high anti-JOHANNE antibody levels.  Little work up in the past year, but reports worsening pain in her trunk and rib cage, unrelieved with Baclofen and Clonazepam. Full strength, minimal stiffness noted on exam but very tender in her trunk.  Pain is her main complaint, feeling she cannot live with it at times.  Clonzepam has been most helpful.  Has never been on pregabalin/lyrica.  She has followed with Dr. Amy Funk at [facility] who has obtained many second opinions.  IVIg short course, rituximab cycle x 1, steroids used.\par 2) Meningioma - sphenoid wing

## 2023-03-20 LAB
AMPA-R ABCBA: NEGATIVE
AMPHIPHYSIN IGG TITR SER IF: NEGATIVE
CASPR2-IGG CBA, S: NEGATIVE
CV2 IGG TITR SER: NEGATIVE
GABA-B ABCBA: NEGATIVE
GAD65 AB SER-MCNC: 1040 NMOL/L
GLIAL NUC TYPE 1 AB TITR SER: NEGATIVE
HU1 AB TITR SER: NEGATIVE
HU2 AB TITR SER IF: NEGATIVE
HU3 AB TITR SER: NEGATIVE
IGLON5 IFA, S: NEGATIVE
IMMUNOLOGIST REVIEW: NORMAL
LGI1-IGG CBA, S: NEGATIVE
NIF IFA, S: NEGATIVE
NMDA-R ABCBA: NEGATIVE
PCA-1 AB TITR SER: NEGATIVE
PCA-2 AB TITR SER: NEGATIVE
PCA-TR AB TITR SER: NEGATIVE

## 2023-03-24 LAB — AMPHIPHYSIN IGG TITR SER IF: NORMAL

## 2023-03-28 ENCOUNTER — APPOINTMENT (OUTPATIENT)
Dept: NEUROLOGY | Facility: CLINIC | Age: 63
End: 2023-03-28
Payer: MEDICARE

## 2023-03-28 VITALS
HEART RATE: 99 BPM | HEIGHT: 59 IN | DIASTOLIC BLOOD PRESSURE: 62 MMHG | WEIGHT: 96 LBS | OXYGEN SATURATION: 70 % | TEMPERATURE: 97.6 F | BODY MASS INDEX: 19.35 KG/M2 | SYSTOLIC BLOOD PRESSURE: 90 MMHG

## 2023-03-28 PROCEDURE — 99214 OFFICE O/P EST MOD 30 MIN: CPT

## 2023-03-28 RX ORDER — PREGABALIN 75 MG/1
75 CAPSULE ORAL
Qty: 30 | Refills: 2 | Status: ACTIVE | COMMUNITY
Start: 2023-02-17 | End: 1900-01-01

## 2023-04-06 ENCOUNTER — APPOINTMENT (OUTPATIENT)
Dept: MRI IMAGING | Facility: CLINIC | Age: 63
End: 2023-04-06
Payer: MEDICARE

## 2023-04-06 ENCOUNTER — APPOINTMENT (OUTPATIENT)
Dept: CT IMAGING | Facility: CLINIC | Age: 63
End: 2023-04-06
Payer: MEDICARE

## 2023-04-06 ENCOUNTER — OUTPATIENT (OUTPATIENT)
Dept: OUTPATIENT SERVICES | Facility: HOSPITAL | Age: 63
LOS: 1 days | End: 2023-04-06

## 2023-04-06 PROCEDURE — 71250 CT THORAX DX C-: CPT | Mod: 26,MH

## 2023-04-06 PROCEDURE — 72146 MRI CHEST SPINE W/O DYE: CPT | Mod: 26,MH

## 2023-04-18 NOTE — HISTORY OF PRESENT ILLNESS
[FreeTextEntry1] : \par Lizbeth is a 62yo woman.\par \par Last visit we attempted trigger point injections, but used kenalog as we had no dexamethasone in stock.\par She reports zero change fromt the injections.\par \par The pain starts in the abdominal area and spreads to the back.\par \par She does not believe she is prescribed pregabalin -but is taking what I prescribed \par \par Reports she tried the pregabalin 50mg for several days, just 50mg at night.  But felt pain may have been worse in the morning and had nausea. However, she may have stopped the clonazepam in exhange, which would explain why pain was worse.  Reports clonazepam had worked the best, but then stopped helping fully, but if she stops things worsen.\par \par Prior to this visit I spoke to Dr. Amy Funk, who first saw her in 2014.\par Presented oddly and initially appeared non-organic, but then some other localized stiff-person videos were viewed at a conference and JOHANNE antibodies were drawn and returned in the 1,000's.\par \par Treated with steroids, PLEX, IVIg short courses only, without benefit. \par Seen by SPS expert at [place] who recommended rituximab.  Seen at [facility] center where it was performed twice.  However she continued to report significant pain.  She was also intermittently difficult to deal with from a personality perspective, flaring up with reported pain.\par \par Found to have Portal vein stenosis - looked for a primary neoplasm.  PET negative.\par Sphenoid wing meningioma - gamma knife just recently completed.\par \par Pushed up cymbalta, no impact. So came back down.  Benzos helpful.\par \par Aide reports that Pt in her house crying all the time.\par \par Clinical trial in [place] - Bone marrow transplant; needed new EMG - but EMG at [place] was not convincing for SPS.  As an aside, she also notes that during the EMG study she was on a lot of clonazepam.\par  \par \par AbsCD19 70\par \par In 2021 in the summer, she was given rituximab at [facility].  She felt she could not tolerate it - felt she was shivering and felt every part of her body 'was going crazy', for months.  She did not perceive any benefit.\par \par She was then offered stem cells about 6 months after the rituximab, and borrowed money for the treatment.  She felt this did nothing for her.  There were also injections performed, which may have been epidural injections.\par \par Pain seems to be the worse for her.  Extensive back pain, first visits felt mostly in the trunk, feels so tight.\par But today, reports mostly cervical upper back tightness and pain.\par \par PRIOR:\par Diagnosed with stiff person syndrome at [place] in 2015, symptoms went back to 2013.\par She would have falls too easily.  Loud noises would make her have body jerking.\par The diagnosis was made from the blood-work, and does not recall every having CSF/LP.\par \par Reports minimal brain symptoms - no seizures, no cognitive dysfunction.\par Also known to have a meningioma.  Operated June 2020.\par Cataract surgery Dec 5, 2021, caused her vision problems which she is hoping will improve.\par \par Went through IVIg, but just a few infusions, perhaps about 6 months.  \par One admission for IVIg, she felt she became more stiff after the IVIg most recently at [facility] just 3 weeks, and derived no benefit.\par \par clonazepam which she still continues at 1mg bid.\par Continues on baclofen 5mg bid.\par She had tried gabapentin once, but did not try it for very long.\par Had not been on pregabalin/lyrica.\par \par She feels she is not sleeping at night and not sleepy during the day.\par \par Sleep Routine:\par Retires: 8-9pm\par Latency: hours\par Arousals: \par Awakens:  4am\par \par \par \par \par

## 2023-04-18 NOTE — DISCUSSION/SUMMARY
[FreeTextEntry1] : Impression:\par 1) anti-JOHANNE antibodes -results continue to show extremely elevated levels 1040 at [place] (N<=0.02).  Dr. Funk reported it was in the 1,000's before.  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  Notes no leg stiffness and lower back is relatively spared.  Controversial between neurologists she has seen but many believe it is true, though possible some overlay.  Treated with steroids, PLEX, IVIg short course, rituximab x 2 (current CD19 slightly low 70) at [facility], but patient's main complaint is with cervico-thoracic back pain.  May also need to consider chronic treatments to avoid progression in disease, as currently localized.  Trouble with tolerating treatments, but may respond better in hospital with slower infusions.\par 3) upper back pain - with trigger points unhelpful - patient with large bony protrutson to the left of the spine, which is where pain is most evident.  Unclear if rib or spine, so will need to image.\par 4) Portal vein stenosis - PET negative for neoplasm.\par 5) Sphenoid wing meningioma - gamma knife just recently, seems unrelated.\par \par \par Plan:\par 1) clonazepam trial at 1mg tid\par 2) pregabalin 50mg qhs to start\par 3) imaging of the thoracic region - spine and ribs.\par 4) consider repeat trigger point injection in the thoracic area, vs referral to pain service.\par 5) RTC in 1 month.

## 2023-05-25 ENCOUNTER — OUTPATIENT (OUTPATIENT)
Dept: OUTPATIENT SERVICES | Facility: HOSPITAL | Age: 63
LOS: 1 days | End: 2023-05-25
Payer: MEDICARE

## 2023-05-25 ENCOUNTER — APPOINTMENT (OUTPATIENT)
Dept: SPINE | Facility: CLINIC | Age: 63
End: 2023-05-25
Payer: MEDICARE

## 2023-05-25 ENCOUNTER — NON-APPOINTMENT (OUTPATIENT)
Age: 63
End: 2023-05-25

## 2023-05-25 VITALS
RESPIRATION RATE: 17 BRPM | DIASTOLIC BLOOD PRESSURE: 69 MMHG | OXYGEN SATURATION: 94 % | BODY MASS INDEX: 19.76 KG/M2 | SYSTOLIC BLOOD PRESSURE: 106 MMHG | HEART RATE: 89 BPM | HEIGHT: 59 IN | TEMPERATURE: 95.7 F | WEIGHT: 98 LBS

## 2023-05-25 DIAGNOSIS — M79.10 MYALGIA, UNSPECIFIED SITE: ICD-10-CM

## 2023-05-25 DIAGNOSIS — D32.0 BENIGN NEOPLASM OF CEREBRAL MENINGES: ICD-10-CM

## 2023-05-25 PROCEDURE — 99204 OFFICE O/P NEW MOD 45 MIN: CPT

## 2023-05-25 PROCEDURE — 72082 X-RAY EXAM ENTIRE SPI 2/3 VW: CPT | Mod: 26

## 2023-05-25 PROCEDURE — 72082 X-RAY EXAM ENTIRE SPI 2/3 VW: CPT

## 2023-05-25 NOTE — HISTORY OF PRESENT ILLNESS
[de-identified] : 62 yo F with PMH of stiff person syndrome (tx with IVIG, rituximab, steroids), sphenoid wing meningioma (s/p resection, gamma knife recently) reports progressively worsening neck to axial mid back pain for over 3 years initially started without injury.\par Pain is described constant severe on her posterior neck radiating down to mid back and bilateral torso which is worsening by standing/walking and alleviated by resting/sitting.\par She tried trigger point injection without relief.\par She reports moderate balance problem d/t pain but denies upper and lower extremities weakness/pain/paresthesia, BB dysfunction, saddle anesthesia.\par Currently she ambulates without assistive device.

## 2023-05-25 NOTE — PHYSICAL EXAM
[Antalgic] : antalgic [] : Motor: [UE Motor Strength NL] : Motor strength of the bilateral upper extremities is normal [LE Motor Strength NL] : Motor strength of the bilateral lower extremities was normal [1+] : left ankle jerk 1+ [Lieberman's Sign] : negative Lieberman's sign [de-identified] : severe scoliotic curvature [de-identified] : Xray scoliosis today in PACS showed severe lumbar scoliosis\par MRI T-spine wo in PACS showed no cord compression/signal changes

## 2023-05-25 NOTE — REASON FOR VISIT
[Initial Visit] : an initial visit for [Back Pain] : back pain [Scoliosis] : scoliosis [Formal Caregiver] : formal caregiver

## 2023-05-25 NOTE — ASSESSMENT
[FreeTextEntry1] : Images were reviewed by Dr. Reaves today. \par \par PLAN\par - MRI C/L spine wo\par - f/u after images for further treatment plan

## 2023-06-12 ENCOUNTER — APPOINTMENT (OUTPATIENT)
Dept: MRI IMAGING | Facility: HOSPITAL | Age: 63
End: 2023-06-12

## 2023-06-12 ENCOUNTER — RESULT REVIEW (OUTPATIENT)
Age: 63
End: 2023-06-12

## 2023-06-12 ENCOUNTER — OUTPATIENT (OUTPATIENT)
Dept: OUTPATIENT SERVICES | Facility: HOSPITAL | Age: 63
LOS: 1 days | End: 2023-06-12
Payer: MEDICARE

## 2023-06-12 PROCEDURE — 72148 MRI LUMBAR SPINE W/O DYE: CPT | Mod: 26,MH

## 2023-06-12 PROCEDURE — 72148 MRI LUMBAR SPINE W/O DYE: CPT | Mod: MH

## 2023-06-12 PROCEDURE — 72141 MRI NECK SPINE W/O DYE: CPT | Mod: 26,MH

## 2023-06-12 PROCEDURE — 72141 MRI NECK SPINE W/O DYE: CPT | Mod: MH

## 2023-07-05 DIAGNOSIS — M48.061 SPINAL STENOSIS, LUMBAR REGION WITHOUT NEUROGENIC CLAUDICATION: ICD-10-CM

## 2023-07-06 ENCOUNTER — APPOINTMENT (OUTPATIENT)
Dept: SPINE | Facility: CLINIC | Age: 63
End: 2023-07-06
Payer: MEDICARE

## 2023-07-06 VITALS
DIASTOLIC BLOOD PRESSURE: 65 MMHG | SYSTOLIC BLOOD PRESSURE: 95 MMHG | TEMPERATURE: 97.7 F | OXYGEN SATURATION: 95 % | HEART RATE: 67 BPM

## 2023-07-06 PROCEDURE — 99215 OFFICE O/P EST HI 40 MIN: CPT

## 2023-07-21 ENCOUNTER — APPOINTMENT (OUTPATIENT)
Dept: NEUROLOGY | Facility: CLINIC | Age: 63
End: 2023-07-21

## 2023-08-08 ENCOUNTER — APPOINTMENT (OUTPATIENT)
Dept: NEUROSURGERY | Facility: CLINIC | Age: 63
End: 2023-08-08
Payer: MEDICARE

## 2023-08-08 DIAGNOSIS — Z78.9 OTHER SPECIFIED HEALTH STATUS: ICD-10-CM

## 2023-08-08 DIAGNOSIS — M54.2 CERVICALGIA: ICD-10-CM

## 2023-08-08 DIAGNOSIS — G95.9 DISEASE OF SPINAL CORD, UNSPECIFIED: ICD-10-CM

## 2023-08-08 PROCEDURE — 99214 OFFICE O/P EST MOD 30 MIN: CPT

## 2023-08-15 ENCOUNTER — APPOINTMENT (OUTPATIENT)
Dept: NEUROSURGERY | Facility: CLINIC | Age: 63
End: 2023-08-15
Payer: MEDICARE

## 2023-08-15 VITALS
HEIGHT: 59 IN | SYSTOLIC BLOOD PRESSURE: 104 MMHG | OXYGEN SATURATION: 99 % | HEART RATE: 59 BPM | WEIGHT: 96.6 LBS | TEMPERATURE: 98.2 F | DIASTOLIC BLOOD PRESSURE: 71 MMHG | BODY MASS INDEX: 19.48 KG/M2

## 2023-08-15 DIAGNOSIS — M41.9 SCOLIOSIS, UNSPECIFIED: ICD-10-CM

## 2023-08-15 DIAGNOSIS — R07.9 CHEST PAIN, UNSPECIFIED: ICD-10-CM

## 2023-08-15 DIAGNOSIS — G25.82 STIFF-MAN SYNDROME: ICD-10-CM

## 2023-08-15 DIAGNOSIS — M62.830 MUSCLE SPASM OF BACK: ICD-10-CM

## 2023-08-15 PROCEDURE — 99213 OFFICE O/P EST LOW 20 MIN: CPT

## 2024-04-01 NOTE — ED PROVIDER NOTE - CLINICAL SUMMARY MEDICAL DECISION MAKING FREE TEXT BOX
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 Pt is followed at [facility] and was worked up for this back about 2 weeks ago with CT a/p (pt brings reports with her) that shows uterine fibroids and remonstration of CBD dilation to 9mm and prominence of main pancreatic duct (stable from 6/27/20) likely benign but recommend MRCP if clinically warranted. Pt states she had an appointment with GI at [facility] but was not happy that it was a televisit. She felt generally weak this morning so decided to come to .     Pt is well-appearing on exam, VSS, no focal neuro deficits, she does appear like she has lost weight. Abd soft, nonsurgical. Labs checked and no emergent findings. She was given tylenol for pain. She was instructed to f/u with outpt GI at [facility] (or [facility] if she prefers) for further w/u of her recent CT findings, no indication for admission and emergent MRCP today. Dietary instructions and return precautions discussed. Stable fro DC.
no

## 2024-07-17 ENCOUNTER — OFFICE (OUTPATIENT)
Dept: URBAN - METROPOLITAN AREA CLINIC 92 | Facility: CLINIC | Age: 64
Setting detail: OPHTHALMOLOGY
End: 2024-07-17
Payer: MEDICARE

## 2024-07-17 DIAGNOSIS — E11.9: ICD-10-CM

## 2024-07-17 DIAGNOSIS — D32.0: ICD-10-CM

## 2024-07-17 DIAGNOSIS — H25.11: ICD-10-CM

## 2024-07-17 DIAGNOSIS — H47.292: ICD-10-CM

## 2024-07-17 PROCEDURE — 92133 CPTRZD OPH DX IMG PST SGM ON: CPT | Performed by: OPHTHALMOLOGY

## 2024-07-17 PROCEDURE — 92083 EXTENDED VISUAL FIELD XM: CPT | Performed by: OPHTHALMOLOGY

## 2024-07-17 PROCEDURE — 92004 COMPRE OPH EXAM NEW PT 1/>: CPT | Performed by: OPHTHALMOLOGY

## 2024-07-17 ASSESSMENT — CONFRONTATIONAL VISUAL FIELD TEST (CVF)
OD_FINDINGS: FULL
OS_FINDINGS: FULL

## 2024-07-18 PROBLEM — E11.9 DIABETES TYPE 2 NO RETINOPATHY ; BOTH EYES: Status: ACTIVE | Noted: 2024-07-17

## 2024-07-18 PROBLEM — H47.292 OPTIC ATROPHY, OTHER; LEFT EYE: Status: ACTIVE | Noted: 2024-07-17
